# Patient Record
Sex: MALE | Race: OTHER | NOT HISPANIC OR LATINO
[De-identification: names, ages, dates, MRNs, and addresses within clinical notes are randomized per-mention and may not be internally consistent; named-entity substitution may affect disease eponyms.]

---

## 2021-02-08 PROBLEM — Z00.00 ENCOUNTER FOR PREVENTIVE HEALTH EXAMINATION: Status: ACTIVE | Noted: 2021-02-08

## 2021-02-19 ENCOUNTER — APPOINTMENT (OUTPATIENT)
Dept: ORTHOPEDIC SURGERY | Facility: CLINIC | Age: 51
End: 2021-02-19
Payer: COMMERCIAL

## 2021-02-19 VITALS
SYSTOLIC BLOOD PRESSURE: 109 MMHG | HEIGHT: 72 IN | BODY MASS INDEX: 21.94 KG/M2 | TEMPERATURE: 98.7 F | WEIGHT: 162 LBS | HEART RATE: 59 BPM | DIASTOLIC BLOOD PRESSURE: 68 MMHG

## 2021-02-19 DIAGNOSIS — M67.441 GANGLION, RIGHT HAND: ICD-10-CM

## 2021-02-19 PROCEDURE — 99204 OFFICE O/P NEW MOD 45 MIN: CPT

## 2021-02-19 PROCEDURE — 99072 ADDL SUPL MATRL&STAF TM PHE: CPT

## 2021-02-19 PROCEDURE — 73140 X-RAY EXAM OF FINGER(S): CPT | Mod: F6,RT

## 2021-02-19 NOTE — END OF VISIT
[FreeTextEntry3] : This note was written by Carin Crawley on 02/19/2021 acting solely as a scribe for Dr. Jae Weber.\par  \par All medical record entries made by the Scribe were at my, Dr. Jae Weber, direction and personally dictated by me on 02/19/2021. I have personally reviewed the chart and agree that the record accurately reflects my personal performance of the history, physical exam, assessment and plan.

## 2021-02-19 NOTE — ADDENDUM
[FreeTextEntry1] : I, Carin Crawley, acted solely as a scribe for Dr. Weber on this date 02/19/2021.

## 2021-02-19 NOTE — DISCUSSION/SUMMARY
[FreeTextEntry1] : He has findings consistent with a right index finger growth, likely consistent with a large ganglion cyst.\par \par I had a discussion regarding today's visit, the diagnosis, and treatment recommendations / options. At this time, we discussed the options of aspiration versus surgical excision. He told me he would like to proceed with surgery.  He deferred an attempt at aspiration.  He will be scheduled in the near future.\par \par -  The nature and purposes of the operation/procedure was discussed in detail.  I discussed the surgical procedure in detail, as well as expected postoperative recovery and outcome.\par -  Possible risks, benefits, and complications (from known and unknown causes) of the procedure were discussed in detail.  \par -  Possible non-operative alternatives to the proposed treatment were discussed in detail.  \par -  He was told that possible risks/complications include, but are not limited to:  Infection, digital nerve or vessel injury, stiffness, painful scar, poor outcome, need for additional surgical procedures, and other unforeseen complications.  \par -  In addition, the possibility of an ""unsuccessful outcome,"" despite ""successful surgery,"" was discussed with him.  Specifically, we discussed approximately 10% chance of recurrence even after successful surgical excision.\par -  The patient fully understands these risks and wishes to proceed.  \par -  I had a lengthy discussion with the patient regarding today's visit, the diagnosis, and my surgical treatment recommendations.  The patient has agreed to this plan of management and has expressed full understanding.  All questions were fully answered to the patient's satisfaction. \par \par I spent at least 30 minutes in total on this patient's visit. This included: Preparation for the visit, review of the medical records, review of pertinent diagnostic studies, examination and counseling of the patient on the above diagnosis, treatment plan and prognosis, orders of diagnostic tests, medication and/or appropriate procedures and documentation in the medical records of today's visit.

## 2021-02-19 NOTE — PHYSICAL EXAM
[de-identified] : - Constitutional: This is a male in no obvious distress.  He is accompanied by his wife today.\par - Psych: Patient is alert and oriented to person, place and time.  Patient has a normal mood and affect.\par - Cardiovascular: Normal pulses throughout the upper extremities.  No significant varicosities are noted in the upper extremities. \par - Neuro: Strength and sensation are intact throughout the upper extremities.  Patient has normal coordination.\par - Respiratory:  Patient exhibits no evidence of shortness of breath or difficulty breathing.\par - Skin: No rashes, lesions, or other abnormalities are noted in the upper extremities.\par \par ---\par  \par Examination of his right index finger demonstrates a soft tissue growth along the dorsal aspect of the finger distally.  This is dorsally along the middle phalanx and distally to the distal aspect of the distal phalanx, in the region of the nail fold.  It appears to be soft likely consistent with a ganglion cyst.  There is a small ridge in the nail.  He has full flexion and extension.  He is neurovascularly intact distally. [de-identified] : AP, lateral and oblique radiographs of his right index finger demonstrate no evidence of arthritis or bony or soft tissue abnormalities, other than soft tissue shadow dorsally.

## 2021-02-19 NOTE — HISTORY OF PRESENT ILLNESS
[Right] : right hand dominant [FreeTextEntry1] : He comes in today for evaluation of a right index finger growth, which developed 2 years ago. He has not had medical attention for this. He has developed a large growth along the middle phalanx of his right index finger that has more recently been increasing in size. \par \par He is accompanied by his wife today.

## 2021-02-23 ENCOUNTER — OUTPATIENT (OUTPATIENT)
Dept: OUTPATIENT SERVICES | Facility: HOSPITAL | Age: 51
LOS: 1 days | End: 2021-02-23
Payer: COMMERCIAL

## 2021-02-23 VITALS
DIASTOLIC BLOOD PRESSURE: 71 MMHG | TEMPERATURE: 97 F | WEIGHT: 167.99 LBS | HEIGHT: 72 IN | HEART RATE: 58 BPM | OXYGEN SATURATION: 99 % | SYSTOLIC BLOOD PRESSURE: 129 MMHG | RESPIRATION RATE: 18 BRPM

## 2021-02-23 DIAGNOSIS — Z01.818 ENCOUNTER FOR OTHER PREPROCEDURAL EXAMINATION: ICD-10-CM

## 2021-02-23 DIAGNOSIS — M67.441 GANGLION, RIGHT HAND: ICD-10-CM

## 2021-02-23 DIAGNOSIS — M67.40 GANGLION, UNSPECIFIED SITE: ICD-10-CM

## 2021-02-23 NOTE — H&P PST ADULT - NSANTHOSAYNRD_GEN_A_CORE
No. ELDON screening performed.  STOP BANG Legend: 0-2 = LOW Risk; 3-4 = INTERMEDIATE Risk; 5-8 = HIGH Risk

## 2021-02-23 NOTE — H&P PST ADULT - NSICDXFAMILYHX_GEN_ALL_CORE_FT
FAMILY HISTORY:  Mother  Still living? Yes, Estimated age: 51-60  Family history of bone cancer, Age at diagnosis: Age Unknown

## 2021-02-23 NOTE — H&P PST ADULT - HISTORY OF PRESENT ILLNESS
51 yo male reports right 2nd index finger cyst at 1st knuckle and cuticle.  He is scheduled for excision of right index finger ganglion cyst on 3/9/2021 at [facility].

## 2021-02-23 NOTE — H&P PST ADULT - ASSESSMENT
51 yo male is scheduled for excision of right index finger ganglion cyst on 3/9/2021 at [facility].

## 2021-02-23 NOTE — H&P PST ADULT - MUSCULOSKELETAL
details… detailed exam right index finger mass -soft, fixed, non-tender on palplation/no joint erythema/no joint warmth/no calf tenderness

## 2021-02-23 NOTE — H&P PST ADULT - NSICDXPASTMEDICALHX_GEN_ALL_CORE_FT
PAST MEDICAL HISTORY:  COPD (chronic obstructive pulmonary disease) mild    Ganglion cyst right index finger    Hypothyroidism     Smoker

## 2021-03-03 PROBLEM — F17.200 NICOTINE DEPENDENCE, UNSPECIFIED, UNCOMPLICATED: Chronic | Status: ACTIVE | Noted: 2021-02-23

## 2021-03-03 PROBLEM — M67.40 GANGLION, UNSPECIFIED SITE: Chronic | Status: ACTIVE | Noted: 2021-02-23

## 2021-03-03 PROBLEM — J44.9 CHRONIC OBSTRUCTIVE PULMONARY DISEASE, UNSPECIFIED: Chronic | Status: ACTIVE | Noted: 2021-02-23

## 2021-03-03 PROBLEM — E03.9 HYPOTHYROIDISM, UNSPECIFIED: Chronic | Status: ACTIVE | Noted: 2021-02-23

## 2021-03-03 PROCEDURE — G0463: CPT

## 2021-03-07 ENCOUNTER — OUTPATIENT (OUTPATIENT)
Dept: OUTPATIENT SERVICES | Facility: HOSPITAL | Age: 51
LOS: 1 days | End: 2021-03-07
Payer: COMMERCIAL

## 2021-03-07 DIAGNOSIS — Z20.828 CONTACT WITH AND (SUSPECTED) EXPOSURE TO OTHER VIRAL COMMUNICABLE DISEASES: ICD-10-CM

## 2021-03-07 LAB — SARS-COV-2 RNA SPEC QL NAA+PROBE: SIGNIFICANT CHANGE UP

## 2021-03-07 PROCEDURE — U0003: CPT

## 2021-03-07 PROCEDURE — U0005: CPT

## 2021-03-08 ENCOUNTER — TRANSCRIPTION ENCOUNTER (OUTPATIENT)
Age: 51
End: 2021-03-08

## 2021-03-09 ENCOUNTER — OUTPATIENT (OUTPATIENT)
Dept: OUTPATIENT SERVICES | Facility: HOSPITAL | Age: 51
LOS: 1 days | End: 2021-03-09
Payer: COMMERCIAL

## 2021-03-09 ENCOUNTER — RESULT REVIEW (OUTPATIENT)
Age: 51
End: 2021-03-09

## 2021-03-09 ENCOUNTER — APPOINTMENT (OUTPATIENT)
Dept: ORTHOPEDIC SURGERY | Facility: HOSPITAL | Age: 51
End: 2021-03-09

## 2021-03-09 VITALS
RESPIRATION RATE: 16 BRPM | TEMPERATURE: 97 F | DIASTOLIC BLOOD PRESSURE: 71 MMHG | OXYGEN SATURATION: 99 % | WEIGHT: 161.16 LBS | SYSTOLIC BLOOD PRESSURE: 129 MMHG | HEIGHT: 70 IN | HEART RATE: 58 BPM

## 2021-03-09 VITALS
TEMPERATURE: 98 F | DIASTOLIC BLOOD PRESSURE: 64 MMHG | RESPIRATION RATE: 16 BRPM | SYSTOLIC BLOOD PRESSURE: 95 MMHG | OXYGEN SATURATION: 98 % | HEART RATE: 60 BPM

## 2021-03-09 DIAGNOSIS — M67.441 GANGLION, RIGHT HAND: ICD-10-CM

## 2021-03-09 PROCEDURE — 26113 EXC HAND TUM DEEP 1.5 CM/>: CPT | Mod: F6

## 2021-03-09 PROCEDURE — 88305 TISSUE EXAM BY PATHOLOGIST: CPT

## 2021-03-09 PROCEDURE — 88305 TISSUE EXAM BY PATHOLOGIST: CPT | Mod: 26

## 2021-03-09 PROCEDURE — 11424 EXC H-F-NK-SP B9+MARG 3.1-4: CPT

## 2021-03-09 RX ORDER — ONDANSETRON 8 MG/1
4 TABLET, FILM COATED ORAL ONCE
Refills: 0 | Status: DISCONTINUED | OUTPATIENT
Start: 2021-03-09 | End: 2021-03-09

## 2021-03-09 RX ORDER — LEVOTHYROXINE SODIUM 125 MCG
1 TABLET ORAL
Qty: 0 | Refills: 0 | DISCHARGE

## 2021-03-09 RX ORDER — BUDESONIDE AND FORMOTEROL FUMARATE DIHYDRATE 160; 4.5 UG/1; UG/1
2 AEROSOL RESPIRATORY (INHALATION)
Qty: 0 | Refills: 0 | DISCHARGE

## 2021-03-09 RX ORDER — CHLORHEXIDINE GLUCONATE 213 G/1000ML
1 SOLUTION TOPICAL ONCE
Refills: 0 | Status: COMPLETED | OUTPATIENT
Start: 2021-03-09 | End: 2021-03-09

## 2021-03-09 RX ORDER — ACETAMINOPHEN WITH CODEINE 300MG-30MG
1 TABLET ORAL
Qty: 7 | Refills: 0
Start: 2021-03-09

## 2021-03-09 RX ORDER — SODIUM CHLORIDE 9 MG/ML
1000 INJECTION, SOLUTION INTRAVENOUS
Refills: 0 | Status: DISCONTINUED | OUTPATIENT
Start: 2021-03-09 | End: 2021-03-09

## 2021-03-09 RX ORDER — HYDROMORPHONE HYDROCHLORIDE 2 MG/ML
0.5 INJECTION INTRAMUSCULAR; INTRAVENOUS; SUBCUTANEOUS
Refills: 0 | Status: DISCONTINUED | OUTPATIENT
Start: 2021-03-09 | End: 2021-03-09

## 2021-03-09 RX ORDER — APREPITANT 80 MG/1
40 CAPSULE ORAL ONCE
Refills: 0 | Status: COMPLETED | OUTPATIENT
Start: 2021-03-09 | End: 2021-03-09

## 2021-03-09 RX ORDER — UMECLIDINIUM BROMIDE AND VILANTEROL TRIFENATATE 62.5; 25 UG/1; UG/1
1 POWDER RESPIRATORY (INHALATION)
Qty: 0 | Refills: 0 | DISCHARGE

## 2021-03-09 RX ADMIN — APREPITANT 40 MILLIGRAM(S): 80 CAPSULE ORAL at 09:46

## 2021-03-09 RX ADMIN — CHLORHEXIDINE GLUCONATE 1 APPLICATION(S): 213 SOLUTION TOPICAL at 09:46

## 2021-03-09 NOTE — ASU DISCHARGE PLAN (ADULT/PEDIATRIC) - ASU DC SPECIAL INSTRUCTIONSFT
Elevate [ Right ] arm in sling daily when up & walking.  Elevate the  hand/arm above heart level on pillow/blankets when lying down.  Pad the neck strap with athletic sock/collared shirt.  Apply ice paks to top of [  Right ] hand for 30 minutes every 3 hours daily.  Keep bandage clean, dry , & intact daily.  May open & close the fingers of the operated arm every hour for exercise.  Call the Dr.  for fever, severe pain, fall or hand injury.  Call for an appointment for office visit  in 10-14 days. Elevate [ Right ] arm in sling daily when up & walking.  Elevate the  hand/arm above heart level on pillow/blankets when lying down.  Pad the neck strap with athletic sock/collared shirt.  Apply ice paks to top of [  Right ] hand for 30 minutes every 3 hours daily.  Keep bandage clean, dry , & intact daily.  Cover Right hand in shower daily with cast bag / plastic bag & tape to keep dry.  May open & close the fingers of the operated arm every hour for exercise.  Call the Dr.  for fever, severe pain, fall or hand injury.  Call for an appointment for office visit  in 10-14 days. Elevate [ Right ] arm in sling daily when up & walking.  Elevate the  hand/arm above heart level on pillow/blankets when lying down.  Pad the neck strap with athletic sock/collared shirt.  Apply ice paks to top of [  Right ] hand for 30 minutes every 3 hours daily.  Keep bandage clean, dry , & intact daily.  Cover Right hand in shower daily with cast bag / plastic bag & tape to keep dry.  May open & close the fingers of the operated arm every hour for exercise.  Call the Dr.  for fever, severe pain, fall or hand injury.  Call for an appointment for office visit  on 3/12/21 in [facility]. Elevate [ Right ] arm when up & walking.  Elevate the  hand/arm above heart level on pillow/blankets when lying down.  Apply ice paks to top of [  Right ] hand for 30 minutes every 3 hours daily.  Keep bandage clean, dry , & intact daily.  Cover Right hand in shower daily with cast bag / plastic bag & tape to keep dry.  May open & close the fingers of the operated arm every hour for exercise.  Call the Dr.  for fever, severe pain, fall or hand injury.  Call for an appointment for office visit  on 3/12/21 in [facility].

## 2021-03-09 NOTE — BRIEF OPERATIVE NOTE - COMMENTS
Tourniquet time =  [place] [facility] Ref # [id number] [facility] [place] Ref # [id number]

## 2021-03-09 NOTE — ASU DISCHARGE PLAN (ADULT/PEDIATRIC) - CARE PROVIDER_API CALL
Jae Weber)  Orthopaedic Surgery; Surgery of the Hand  [address]  [place] [address]  Phone: [phone]  Fax: [phone]  Follow Up Time:    Jae Weber)  Orthopaedic Surgery; Surgery of the Hand  [address], [address]  [place] [address]  Phone: [phone]  Fax: [phone]  Scheduled Appointment: 03/12/2021

## 2021-03-09 NOTE — ASU DISCHARGE PLAN (ADULT/PEDIATRIC) - CALL YOUR DOCTOR IF YOU HAVE ANY OF THE FOLLOWING:
Swelling that gets worse/Pain not relieved by Medications/Wound/Surgical Site with redness, or foul smelling discharge or pus

## 2021-03-12 ENCOUNTER — APPOINTMENT (OUTPATIENT)
Dept: ORTHOPEDIC SURGERY | Facility: CLINIC | Age: 51
End: 2021-03-12
Payer: COMMERCIAL

## 2021-03-12 VITALS — TEMPERATURE: 98 F

## 2021-03-12 PROCEDURE — 99024 POSTOP FOLLOW-UP VISIT: CPT

## 2021-03-12 NOTE — HISTORY OF PRESENT ILLNESS
[de-identified] : 3 days postoperative. [de-identified] : 3 days status post excision of right index finger lipoma.\par \par Pathology demonstrated an angiolipoma.\par \par He is doing well and has minimal discomfort.\par \par He was accompanied by his wife today. [de-identified] : Examination of his right index finger after the dressing was removed demonstrates his incision to be clean and dry.  There is mild swelling.  There is no drainage or evidence of an infection.  He is neurovascularly intact distally. [de-identified] : Stable, 3 days postoperative. [de-identified] : A dressing was applied.  Instructed on local wound care and gentle range of motion exercises.  He will follow-up in 1 week for suture removal.

## 2021-03-12 NOTE — RETURN TO WORK/SCHOOL
[FreeTextEntry1] : Patient was seen in the office today. He is to remain out of work until reevaluated in one week

## 2021-03-19 ENCOUNTER — APPOINTMENT (OUTPATIENT)
Dept: ORTHOPEDIC SURGERY | Facility: CLINIC | Age: 51
End: 2021-03-19
Payer: COMMERCIAL

## 2021-03-19 VITALS — TEMPERATURE: 98.2 F

## 2021-03-19 PROCEDURE — 99024 POSTOP FOLLOW-UP VISIT: CPT

## 2021-03-19 NOTE — HISTORY OF PRESENT ILLNESS
[de-identified] : 10 days postoperative. [de-identified] : 10 days status post excision of right index finger lipoma.\par \par Pathology demonstrated an angiolipoma.\par \par He is doing well and has minimal discomfort.\par \par He was accompanied by his wife today. [de-identified] : Examination of his right index finger demonstrates his incision to be healing well.  There is decreased swelling.  He has improved flexion and extension with limitation of terminal flexion.  He remains neurovascularly intact distally.  [de-identified] : Stable, 10 days postoperative. [de-identified] : The sutures were removed and Steri-Strips were applied.  He was instructed on local wound care, when to begin scar massage and desensitization, and range of motion exercises.  He will followup in 3 weeks.  The patient was instructed to return before then or to call the office if there are any problems in the interim.

## 2021-04-02 ENCOUNTER — TRANSCRIPTION ENCOUNTER (OUTPATIENT)
Age: 51
End: 2021-04-02

## 2021-04-02 PROBLEM — D17.21 BENIGN LIPOMATOUS NEOPLASM OF SKIN, SUBCU OF RIGHT ARM: Status: ACTIVE | Noted: 2021-03-10

## 2021-04-09 ENCOUNTER — APPOINTMENT (OUTPATIENT)
Dept: ORTHOPEDIC SURGERY | Facility: CLINIC | Age: 51
End: 2021-04-09
Payer: COMMERCIAL

## 2021-04-09 DIAGNOSIS — D17.21 BENIGN LIPOMATOUS NEOPLASM OF SKIN AND SUBCUTANEOUS TISSUE OF RIGHT ARM: ICD-10-CM

## 2021-04-09 PROCEDURE — 99024 POSTOP FOLLOW-UP VISIT: CPT

## 2021-04-09 NOTE — ADDENDUM
[FreeTextEntry1] : I, Carin Crawley, acted solely as a scribe for Dr. Weber on this date 04/09/2021.

## 2021-04-09 NOTE — END OF VISIT
[FreeTextEntry3] : This note was written by Carin Crawley on 04/09/2021 acting solely as a scribe for Dr. Jae Weber.\par  \par All medical record entries made by the Scribe were at my, Dr. Jae Weber, direction and personally dictated by me on 04/09/2021. I have personally reviewed the chart and agree that the record accurately reflects my personal performance of the history, physical exam, assessment and plan.

## 2021-04-09 NOTE — HISTORY OF PRESENT ILLNESS
[de-identified] : 31 days postoperative. [de-identified] : 31 days status post excision of right index finger lipoma.\par \par Pathology demonstrated an angiolipoma.\par \par He is doing well. He continues to have some discomfort in the finger, as well ass occasional sharp pains. \par \par He was accompanied by his wife today.  [de-identified] : Examination of his right index finger demonstrates his incision to be healing well.  There is decreased swelling.  He has a somewhat hypertrophic scar.  He has improved flexion and extension with limitation of terminal flexion.  He remains neurovascularly intact distally.  [de-identified] : Stable, 31 days postoperative.  He does have some residual stiffness and symptoms. [de-identified] : He was instructed on continued range of motion exercises and scar massage and desensitization.  I also recommended use of a Coban wrap when working.  He will follow-up in 6-8 weeks if needed.

## 2023-08-31 NOTE — H&P PST ADULT - BP NONINVASIVE MEAN (MM HG)
PROGRESS NOTE      HISTORY OF PRESENT ILLNESS:    Patient is a 59 year old male who present today for  Follow up    Hypertension for which he is on therapy with lisinopril 5 mg p.o. daily.  Raheel tolerates medication well without any side effects or problems such as lightheadedness or dizziness.  No headache, visual change, chest pain, palpitations, shortness of breath, lower extremity edema, stroke or transient ischemic attack-like symptoms.  Compliance with medication is good.        Vitals:    08/31/23 0810   BP: 108/82   Pulse: 76      General-well-built well-nourished no acute distress  Chest clear  Cardiovascular-normal rate and rhythm   Gait is normal  Labs:  none      ASSESSMENT/PLAN:  HTN (hypertension), benign  Good control with current medication.  He is to continue lisinopril 5 mg p.o. daily, adverse effects of electrolyte abnormality dizziness elevated creatinine reviewed with the patient.  Recommend low-salt diet.  Refills given  - Comprehensive Metabolic Panel; Future  - CBC with Automated Differential; Future  - lisinopril (ZESTRIL) 5 MG tablet; Take 1 tablet by mouth daily.  Dispense: 93 tablet; Refill: 1      Health maintenance  Annual physical exam  Future blood work order  - Comprehensive Metabolic Panel; Future  - CBC with Automated Differential; Future  - PSA; Future    Screening for prostate cancer    - PSA; Future      Review Flowsheet  More data exists       8/31/2023   PHQ 2/9 Score   Adult PHQ 2 Score 0   Adult PHQ 2 Interpretation No further screening needed   Little interest or pleasure in activity? Not at all   Feeling down, depressed or hopeless? Not at all   Adult PHQ 9 Score 1   Adult PHQ 9 Interpretation Minimal Depression   Trouble falling or staying asleep or sleeping all the time? Not at all   Feeling tired or having little energy? Several days   Poor appetite or overeating? Not at all   Feeling bad about yourself or that you are a failure or have let yourself or family down?  Not at all   Trouble concentrating on things such as reading the newspaper or watching TV? Not at all   Moving or speaking slowly that other people have noticed or the opposite - being so fidgety or restless that you have been moving around a lot more than usual? Not at all   Thoughts that you would be better off dead or of hurting yourself in some way? Not at all   If you reported any problems, how difficult have these problems made it to do your work, take care of things at home, or get along with other people? Not difficult at all       DEPRESSION ASSESSMENT/PLAN:  Depression screening is negative no further plan needed.      Orders Placed This Encounter   • Comprehensive Metabolic Panel   • CBC with Automated Differential   • PSA   • Lipid Panel With Reflex   • lisinopril (ZESTRIL) 5 MG tablet         FOLLOW-UP:  Return in about 6 months (around 2/29/2024) for physical, with fasting labs 3 days prior to appointment..     90
